# Patient Record
Sex: FEMALE | Race: BLACK OR AFRICAN AMERICAN | Employment: OTHER | URBAN - METROPOLITAN AREA
[De-identification: names, ages, dates, MRNs, and addresses within clinical notes are randomized per-mention and may not be internally consistent; named-entity substitution may affect disease eponyms.]

---

## 2022-09-01 ENCOUNTER — APPOINTMENT (OUTPATIENT)
Dept: GENERAL RADIOLOGY | Age: 42
End: 2022-09-01
Attending: PHYSICIAN ASSISTANT
Payer: MEDICARE

## 2022-09-01 ENCOUNTER — HOSPITAL ENCOUNTER (EMERGENCY)
Age: 42
Discharge: HOME | End: 2022-09-01
Attending: EMERGENCY MEDICINE
Payer: MEDICARE

## 2022-09-01 VITALS
WEIGHT: 280 LBS | RESPIRATION RATE: 20 BRPM | TEMPERATURE: 97 F | HEIGHT: 66 IN | SYSTOLIC BLOOD PRESSURE: 154 MMHG | HEART RATE: 89 BPM | BODY MASS INDEX: 45 KG/M2 | DIASTOLIC BLOOD PRESSURE: 66 MMHG | OXYGEN SATURATION: 99 %
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
## 2022-09-01 DIAGNOSIS — M75.41 IMPINGEMENT SYNDROME OF RIGHT SHOULDER: Primary | ICD-10-CM

## 2022-09-01 PROCEDURE — 99283 EMERGENCY DEPT VISIT LOW MDM: CPT

## 2022-09-01 PROCEDURE — 73030 X-RAY EXAM OF SHOULDER: CPT | Performed by: PHYSICIAN ASSISTANT

## 2022-09-01 RX ORDER — TRIAMCINOLONE ACETONIDE 1 MG/G
CREAM TOPICAL 2 TIMES DAILY
Qty: 15 G | Refills: 0 | Status: SHIPPED | OUTPATIENT
Start: 2022-09-01

## 2024-05-15 ENCOUNTER — HOSPITAL ENCOUNTER (EMERGENCY)
Age: 44
Discharge: HOME | End: 2024-05-15
Attending: EMERGENCY MEDICINE

## 2024-05-15 ENCOUNTER — APPOINTMENT (OUTPATIENT)
Dept: GENERAL RADIOLOGY | Age: 44
End: 2024-05-15

## 2024-05-15 VITALS
HEART RATE: 85 BPM | WEIGHT: 270 LBS | OXYGEN SATURATION: 100 % | BODY MASS INDEX: 43.39 KG/M2 | TEMPERATURE: 98 F | DIASTOLIC BLOOD PRESSURE: 93 MMHG | RESPIRATION RATE: 16 BRPM | SYSTOLIC BLOOD PRESSURE: 160 MMHG | HEIGHT: 66 IN

## 2024-05-15 DIAGNOSIS — M79.641 RIGHT HAND PAIN: Primary | ICD-10-CM

## 2024-05-15 PROCEDURE — 99283 EMERGENCY DEPT VISIT LOW MDM: CPT

## 2024-05-15 PROCEDURE — 73130 X-RAY EXAM OF HAND: CPT

## 2024-05-15 PROCEDURE — 99284 EMERGENCY DEPT VISIT MOD MDM: CPT

## 2024-05-15 NOTE — ED INITIAL ASSESSMENT (HPI)
Patient states right wrist pain for two days. No known injury but states may have slept on it \"funny\" states has happened before and had to be 'Popped\" back into place. Hand is swollen pain in the wrist

## 2024-05-15 NOTE — ED PROVIDER NOTES
Patient Seen in: [facility] In [place]      History     Chief Complaint   Patient presents with    Arm or Hand Injury     Stated Complaint: right wrist pain for past two days, no injury    Subjective:   HPI    44-year-old female presents to the ER for evaluation of right hand pain from 2 days ago.  Patient states she slept on her hand wrong and \"popped it out of place\".  Patient states she \"popped it back into place\" and has been having swelling since then.  States this has happened in the past.    Objective:   Past Medical History:    Anemia    Hiatal hernia    Multiple sclerosis (HCC)              Past Surgical History:   Procedure Laterality Date    Cholecystectomy      Other surgical history      right knee surgery                Social History     Socioeconomic History    Marital status: Single   Tobacco Use    Smoking status: Some Days   Substance and Sexual Activity    Alcohol use: Yes     Comment: Occasional              Review of Systems    Positive for stated complaint: right wrist pain for past two days, no injury  Other systems are as noted in HPI.  Constitutional and vital signs reviewed.      All other systems reviewed and negative except as noted above.    Physical Exam     ED Triage Vitals [05/15/24 1037]   BP (!) 160/93   Pulse 85   Resp 16   Temp 98.4 °F (36.9 °C)   Temp src Temporal   SpO2 100 %   O2 Device None (Room air)       Current Vitals:   Vital Signs  BP: (!) 160/93  Pulse: 85  Resp: 16  Temp: 98.4 °F (36.9 °C)  Temp src: Temporal    Oxygen Therapy  SpO2: 100 %  O2 Device: None (Room air)            Physical Exam  Vitals and nursing note reviewed.   Constitutional:       Appearance: She is well-developed.   HENT:      Head: Atraumatic.      Right Ear: External ear normal.      Left Ear: External ear normal.   Eyes:      Conjunctiva/sclera: Conjunctivae normal.   Cardiovascular:      Rate and Rhythm: Normal rate.   Pulmonary:      Effort: Pulmonary effort is normal.    Musculoskeletal:      Right wrist: No bony tenderness or snuff box tenderness.      Right hand: Swelling (mild, dorsum) and tenderness (dorsum) present. No deformity or bony tenderness. Normal range of motion. Normal strength. Normal pulse.      Cervical back: Normal range of motion and neck supple.   Skin:     General: Skin is warm and dry.      Capillary Refill: Capillary refill takes less than 2 seconds.   Neurological:      Mental Status: She is alert.   Psychiatric:         Mood and Affect: Mood normal.               ED Course   Labs Reviewed - No data to display        XR HAND (MIN 3 VIEWS), RIGHT (CPT=73130)    Result Date: 5/15/2024  PROCEDURE:  XR HAND (MIN 3 VIEWS), RIGHT (CPT=73130)  TECHNIQUE:  Three views of the right hand were obtained.  COMPARISON:  None.  INDICATIONS:  right wrist pain for past two days, no injury, hand pain  PATIENT STATED HISTORY: (As transcribed by Technologist)  Pt felt a \"pop\" in her right hand 2 days ago near her distal ulna.  She popped it back but has continued pain and swelling to the area.   FINDINGS:  No evidence of acute displaced fracture or dislocation.  Normal mineralization.  Unremarkable soft tissues.  Ornaments noted along the 2nd and 5th digit nail beds            CONCLUSION:  No evidence of acute displaced fracture or dislocation in the right hand.  LOCATION:  [facility]   Dictated by (CST): Tyron Santos MD on 5/15/2024 at 11:31 AM     Finalized by (CST): Tyron Santos MD on 5/15/2024 at 11:31 AM                 MDM          44-year-old female presents with right hand pain after \"it popped out of place\" while she was sleeping.  Mild swelling noted on exam.  No deformity.  mild distal pulses.    Differential diagnosis includes but not limited to:  Fracture, dislocation, ligament injury    XR negative for acute fracture.   I have reviewed the XR images independently.     Patient informed of results.  Advised rest, ice and elevate at home.  Ibuprofen for pain.   Ortho referral provided.  Velcro wrist splint for comfort.  Return precautions given.  All questions answered.                           Medical Decision Making      Disposition and Plan     Clinical Impression:  1. Right hand pain         Disposition:  Discharge  5/15/2024 12:26 pm    Follow-up:  Dori Gore PA  [address] [place] [id number]  [phone]    Follow up            Medications Prescribed:  Current Discharge Medication List

## 2025-03-26 ENCOUNTER — HOSPITAL ENCOUNTER (EMERGENCY)
Age: 45
Discharge: HOME | End: 2025-03-26
Attending: EMERGENCY MEDICINE
Payer: MEDICARE

## 2025-03-26 VITALS
SYSTOLIC BLOOD PRESSURE: 148 MMHG | TEMPERATURE: 99 F | OXYGEN SATURATION: 100 % | HEART RATE: 107 BPM | BODY MASS INDEX: 45.48 KG/M2 | DIASTOLIC BLOOD PRESSURE: 88 MMHG | HEIGHT: 66 IN | RESPIRATION RATE: 22 BRPM | WEIGHT: 283 LBS

## 2025-03-26 DIAGNOSIS — J06.9 UPPER RESPIRATORY TRACT INFECTION, UNSPECIFIED TYPE: Primary | ICD-10-CM

## 2025-03-26 LAB
POCT INFLUENZA A: NEGATIVE
POCT INFLUENZA B: NEGATIVE
SARS-COV-2 RNA RESP QL NAA+PROBE: NOT DETECTED

## 2025-03-26 PROCEDURE — 99283 EMERGENCY DEPT VISIT LOW MDM: CPT

## 2025-03-26 PROCEDURE — 87502 INFLUENZA DNA AMP PROBE: CPT | Performed by: EMERGENCY MEDICINE

## 2025-03-26 NOTE — ED PROVIDER NOTES
Patient Seen in: [facility] In [place]      History     Chief Complaint   Patient presents with    Cough/URI     Stated Complaint: cough with intermittent fevers x 1 week    Subjective:   HPI      45-year-old female complaining of cough and intermittent fever for about a week.  She is coughing up some phlegm no shortness of breath no sore throat or earache eyes any diarrhea.  She is here with her son with similar symptoms.    Objective:     Past Medical History:    Anemia    Hiatal hernia    Multiple sclerosis (HCC)              Past Surgical History:   Procedure Laterality Date    Cholecystectomy      Other surgical history      right knee surgery                Social History     Socioeconomic History    Marital status: Single   Tobacco Use    Smoking status: Some Days   Vaping Use    Vaping status: Never Used   Substance and Sexual Activity    Alcohol use: Yes     Comment: Occasional    Drug use: Not Currently                  Physical Exam     ED Triage Vitals [03/26/25 0931]   /88   Pulse 107   Resp 22   Temp 98.7 °F (37.1 °C)   Temp src    SpO2 100 %   O2 Device None (Room air)       Current Vitals:   Vital Signs  BP: 148/88  Pulse: 107  Resp: 22  Temp: 98.7 °F (37.1 °C)    Oxygen Therapy  SpO2: 100 %  O2 Device: None (Room air)        Physical Exam  Alert and oriented ×3 in no acute distress.  HEENT exam within normal limits.  Neck supple without lymphadenopathy or JVD.  Lungs are clear to auscultation.  Cardiovascular exam regular rate and rhythm without murmurs.  Abdomen is soft and nontender without rebound or guarding.  Extremities no clubbing cyanosis or edema.  Skin there is no rash.  Neurologic exam is within normal limits no focal deficits.    ED Course     Labs Reviewed   RAPID SARS-COV-2 BY PCR - Normal   POCT FLU TEST - Normal    Narrative:     This assay is a rapid molecular in vitro test utilizing nucleic acid amplification of influenza A and B viral RNA.             Abnormal Labs Reviewed - No abnormal labs to display  Labs unremarkable       MDM      Initial differential diagnosis considered but not limited to includes COVID-pneumonia URI sinusitis        Medical Decision Making  Patient appears well ablates nonspecific upper EXTR infection her son is here and has influenza B    Disposition and Plan     Clinical Impression:  1. Upper respiratory tract infection, unspecified type         Disposition:  Discharge  3/26/2025 10:23 am    Follow-up:  No follow-up provider specified.        Medications Prescribed:  Discharge Medication List as of 3/26/2025 10:27 AM              Supplementary Documentation:

## 2025-05-06 NOTE — DISCHARGE INSTRUCTIONS
Over-the-counter cough or cold medications follow-up with your doctor over next 2 days return any problems  
Refer to the Assessment tab to view/cancel completed assessment.